# Patient Record
Sex: FEMALE | Race: WHITE | Employment: FULL TIME | URBAN - METROPOLITAN AREA
[De-identification: names, ages, dates, MRNs, and addresses within clinical notes are randomized per-mention and may not be internally consistent; named-entity substitution may affect disease eponyms.]

---

## 2021-01-16 ENCOUNTER — APPOINTMENT (OUTPATIENT)
Dept: GENERAL RADIOLOGY | Age: 61
End: 2021-01-16
Payer: COMMERCIAL

## 2021-01-16 ENCOUNTER — HOSPITAL ENCOUNTER (EMERGENCY)
Age: 61
Discharge: HOME | End: 2021-01-16
Attending: EMERGENCY MEDICINE
Payer: COMMERCIAL

## 2021-01-16 VITALS
OXYGEN SATURATION: 94 % | BODY MASS INDEX: 27.46 KG/M2 | HEIGHT: 63 IN | SYSTOLIC BLOOD PRESSURE: 172 MMHG | WEIGHT: 155 LBS | RESPIRATION RATE: 16 BRPM | HEART RATE: 100 BPM | TEMPERATURE: 96.8 F | DIASTOLIC BLOOD PRESSURE: 102 MMHG

## 2021-01-16 DIAGNOSIS — U07.1 BRONCHITIS DUE TO COVID-19 VIRUS: Primary | ICD-10-CM

## 2021-01-16 DIAGNOSIS — J40 BRONCHITIS DUE TO COVID-19 VIRUS: Primary | ICD-10-CM

## 2021-01-16 PROCEDURE — 6370000000 HC RX 637 (ALT 250 FOR IP): Performed by: EMERGENCY MEDICINE

## 2021-01-16 PROCEDURE — 99283 EMERGENCY DEPT VISIT LOW MDM: CPT

## 2021-01-16 RX ORDER — PREDNISONE 10 MG/1
TABLET ORAL
Qty: 30 TABLET | Refills: 0 | Status: SHIPPED | OUTPATIENT
Start: 2021-01-16 | End: 2021-01-26

## 2021-01-16 RX ORDER — ALBUTEROL SULFATE 90 UG/1
2 AEROSOL, METERED RESPIRATORY (INHALATION) ONCE
Status: COMPLETED | OUTPATIENT
Start: 2021-01-16 | End: 2021-01-16

## 2021-01-16 RX ORDER — PREDNISONE 10 MG/1
60 TABLET ORAL ONCE
Status: COMPLETED | OUTPATIENT
Start: 2021-01-16 | End: 2021-01-16

## 2021-01-16 RX ORDER — ALBUTEROL SULFATE 90 UG/1
2 AEROSOL, METERED RESPIRATORY (INHALATION) EVERY 4 HOURS PRN
Qty: 1 INHALER | Refills: 1 | Status: SHIPPED | OUTPATIENT
Start: 2021-01-16 | End: 2021-02-15

## 2021-01-16 RX ORDER — IPRATROPIUM BROMIDE AND ALBUTEROL SULFATE 2.5; .5 MG/3ML; MG/3ML
1 SOLUTION RESPIRATORY (INHALATION) EVERY 4 HOURS
Qty: 360 ML | Refills: 0 | Status: SHIPPED | OUTPATIENT
Start: 2021-01-16

## 2021-01-16 RX ADMIN — ALBUTEROL SULFATE 2 PUFF: 90 AEROSOL, METERED RESPIRATORY (INHALATION) at 21:12

## 2021-01-16 RX ADMIN — PREDNISONE 60 MG: 10 TABLET ORAL at 21:12

## 2021-01-17 NOTE — ED PROVIDER NOTES
eMERGENCY dEPARTMENT eNCOUnter        [facility]    Chief Complaint   Patient presents with    Shortness of Breath       HPI    Rosemary Parisi is a 62 y.o. female who presents with cough, shortness of breath, wheezing. She states she was diagnosed recently with Covid, states she just finished course of steroids, finished it yesterday. She states when she was on steroid she felt really good. She took it for 5 days total.  She states that since yesterday she has more wheezing, more shortness of breath. She states she was just concerned about going to the night tonight without her inhaler, nebulizer solution and prednisone which she states has been helping her a lot as she does have history of asthma. She denies fever or chills. No chest pain. No edema. No vomiting, diarrhea. REVIEW OF SYSTEMS    Constitutional:  Denies fever, chills  HENT:  See above  Eyes: No vision change, discharge  Cardiovascular:  Denies chest pain, palpitations. Respiratory:  + shortness of breath, + cough  GI:  Denies abdominal pain, vomiting, or diarrhea   :  Denies any urinary symptoms  Extremity: Denies edema, joint pain  Neurologic:  Denies headache, focal weakness  Skin: Denies rash, color change       All other review of systems are negative  See HPI and nursing notes for additional information       PAST MEDICAL AND SURGICAL HISTORY    Past Medical History:   Diagnosis Date    Asthma      History reviewed. No pertinent surgical history. CURRENT MEDICATIONS        ALLERGIES    No Known Allergies    FAMILY AND SOCIAL HISTORY    History reviewed. No pertinent family history.   Social History     Socioeconomic History    Marital status: None     Spouse name: None    Number of children: None    Years of education: None    Highest education level: None   Occupational History    None   Social Needs    Financial resource strain: None    Food insecurity     Worry: None     Inability: None    Transportation needs Medical: None     Non-medical: None   Tobacco Use    Smoking status: Never Smoker    Smokeless tobacco: Never Used   Substance and Sexual Activity    Alcohol use: Not Currently    Drug use: Never    Sexual activity: None   Lifestyle    Physical activity     Days per week: None     Minutes per session: None    Stress: None   Relationships    Social connections     Talks on phone: None     Gets together: None     Attends [employer] service: None     Active member of club or organization: None     Attends meetings of clubs or organizations: None     Relationship status: None    Intimate partner violence     Fear of current or ex partner: None     Emotionally abused: None     Physically abused: None     Forced sexual activity: None   Other Topics Concern    None   Social History Narrative    None       PHYSICAL EXAM    VITAL SIGNS: BP (!) 172/102   Pulse 100   Temp 96.8 °F (36 °C) (Oral)   Resp 16   Ht 5' 3\" (1.6 m)   Wt 155 lb (70.3 kg)   SpO2 94%   BMI 27.46 kg/m²   Constitutional:  Awake, alert, no acute distress, non-toxic appearance   Eyes: PERRL, EOM intact. Conjunctiva normal.  HENT:  - Normocephalic, atraumatic     - Oropharynx mildly erythematous without tonsillar hypertrophy or exudate. No trimus. Handling secretions without difficulty. Neck/Lymphatics:  supple, no lymphadenopathy   Respiratory:  Normal respiratory effort. Occasional end expiratory wheezing. Cardiovascular:  Regular rate, normal rhythm. No murmurs. GI:  Soft, no abdominal tenderness, nondistended. Musculoskeletal:  Normal ROM, no edema   Integument:  Skin pink, warm, dry, no rash      RADIOLOGY/PROCEDURES    No results found. ED COURSE & MEDICAL DECISION MAKING       Vital signs and nursing notes reviewed during ED course. I have independently evaluated this patient . All pertinent Lab data and radiographic results reviewed with patient at bedside.        The patient and/or the family were informed of the results of any tests/labs/imaging, the treatment plan, and time was allotted to answer questions. This is a 52-year-old female who presented with cough, shortness of breath, wheezing. She does have history of asthma, was diagnosed with Covid recently. She was 96% on room air on arrival.  She appears no respiratory distress. She speaks without difficulty. She does have some occasional wheezing. She states she is mainly just concerned about going through the night without her inhaler as she ran out of it. She was requesting a refill of this, did want to continue on prednisone as she was on it for 5 days and that did not seem to help. She was given a dose of that here, inhaler treatment here. Will prescribe continued prednisone, refill of inhaler and nebulizer solution for home as this has been helping her. She has no further concerns at this time. No chest pain, edema. She is well-appearing, nontoxic and does not appear to be in distress. Pressure was elevated in the emergency department, however she is very anxious, did not want to go through the night without her inhaler. Did not request any further testing tonight as she is already been tested for Covid and was positive. Given her well appearance will plan for discharge home with refills of her medications and if she has any new or worsening signs or symptoms she should return for reevaluation. She voiced understanding the discharge instructions return precautions. Clinical  IMPRESSION    Covid infection, history of asthma      Diagnosis and plan discussed in detail with patient who understands and agrees. Patient agrees to return emergency department if symptoms worsen or any new symptoms develop.       (Please note the MDM and HPI sections of this note were completed with a voice recognition program.  Efforts were made to edit the dictations but occasionally words are mis-transcribed.)      Staci Rees DO  01/16/21 2053

## 2021-01-18 ENCOUNTER — CARE COORDINATION (OUTPATIENT)
Dept: CARE COORDINATION | Age: 61
End: 2021-01-18

## 2021-01-19 ENCOUNTER — CARE COORDINATION (OUTPATIENT)
Dept: CARE COORDINATION | Age: 61
End: 2021-01-19

## 2024-03-25 ENCOUNTER — HOSPITAL ENCOUNTER (EMERGENCY)
Age: 64
Discharge: HOME | End: 2024-03-25
Attending: EMERGENCY MEDICINE
Payer: COMMERCIAL

## 2024-03-25 VITALS
HEIGHT: 62 IN | WEIGHT: 149 LBS | HEART RATE: 102 BPM | DIASTOLIC BLOOD PRESSURE: 94 MMHG | SYSTOLIC BLOOD PRESSURE: 140 MMHG | OXYGEN SATURATION: 95 % | RESPIRATION RATE: 18 BRPM | TEMPERATURE: 99.4 F | BODY MASS INDEX: 27.42 KG/M2

## 2024-03-25 DIAGNOSIS — J45.21 MILD INTERMITTENT ASTHMA WITH EXACERBATION: Primary | ICD-10-CM

## 2024-03-25 DIAGNOSIS — J06.9 VIRAL UPPER RESPIRATORY TRACT INFECTION WITH COUGH: ICD-10-CM

## 2024-03-25 PROCEDURE — 6370000000 HC RX 637 (ALT 250 FOR IP): Performed by: EMERGENCY MEDICINE

## 2024-03-25 PROCEDURE — 99283 EMERGENCY DEPT VISIT LOW MDM: CPT

## 2024-03-25 RX ORDER — FLUTICASONE PROPIONATE 50 MCG
2 SPRAY, SUSPENSION (ML) NASAL DAILY
Qty: 16 G | Refills: 0 | Status: SHIPPED | OUTPATIENT
Start: 2024-03-25

## 2024-03-25 RX ORDER — ALBUTEROL SULFATE 2.5 MG/3ML
2.5 SOLUTION RESPIRATORY (INHALATION) 4 TIMES DAILY PRN
Qty: 120 EACH | Refills: 0 | Status: SHIPPED | OUTPATIENT
Start: 2024-03-25

## 2024-03-25 RX ORDER — PREDNISONE 50 MG/1
50 TABLET ORAL DAILY
Qty: 5 TABLET | Refills: 0 | Status: SHIPPED | OUTPATIENT
Start: 2024-03-25 | End: 2024-03-30

## 2024-03-25 RX ORDER — IPRATROPIUM BROMIDE AND ALBUTEROL SULFATE 2.5; .5 MG/3ML; MG/3ML
1 SOLUTION RESPIRATORY (INHALATION) ONCE
Status: COMPLETED | OUTPATIENT
Start: 2024-03-25 | End: 2024-03-25

## 2024-03-25 RX ORDER — PREDNISONE 20 MG/1
60 TABLET ORAL ONCE
Status: COMPLETED | OUTPATIENT
Start: 2024-03-25 | End: 2024-03-25

## 2024-03-25 RX ADMIN — IPRATROPIUM BROMIDE AND ALBUTEROL SULFATE 1 DOSE: 2.5; .5 SOLUTION RESPIRATORY (INHALATION) at 08:50

## 2024-03-25 RX ADMIN — PREDNISONE 60 MG: 20 TABLET ORAL at 08:49

## 2024-03-25 ASSESSMENT — PAIN - FUNCTIONAL ASSESSMENT
PAIN_FUNCTIONAL_ASSESSMENT: NONE - DENIES PAIN
PAIN_FUNCTIONAL_ASSESSMENT: NONE - DENIES PAIN

## 2024-03-25 NOTE — ED NOTES
Discharge instruction and prescriptions were reviewed and the patient will follow up with Pulmonology. The patient voiced understanding.

## 2024-03-25 NOTE — DISCHARGE INSTRUCTIONS
Follow-up with and establish primary care physician with Dr. Hough call for an appointment  Keep scheduled appointment with pulmonologist in 2 days.  Take prednisone as prescribed and directed  Take albuterol nebulizer solution every 4 hours as needed  Take Flonase as prescribed and directed  Return to the emergency department immediately pain fever chills nausea dizzy lightheadedness or any worsening symptoms.

## 2024-03-25 NOTE — DISCHARGE INSTR - COC
Continuity of Care Form    Patient Name: Kalyn Phillips   :  1960  MRN:  [id number]    Admit date:  3/25/2024  Discharge date:  ***    Code Status Order: No Order   Advance Directives:     Admitting Physician:  No admitting provider for patient encounter.  PCP: No primary care provider on file.    Discharging Nurse: ***  Discharging Hospital Unit/Room#: ED-03/E03  Discharging Unit Phone Number: ***    Emergency Contact:   Extended Emergency Contact Information  Primary Emergency Contact: Chris Khanhanie  Mobile Phone: [phone]  Relation: Brother/Sister   needed? No    Past Surgical History:  History reviewed. No pertinent surgical history.    Immunization History:     There is no immunization history on file for this patient.    Active Problems:  There is no problem list on file for this patient.      Isolation/Infection:   Isolation            No Isolation          Patient Infection Status       None to display            Nurse Assessment:  Last Vital Signs: BP (!) 140/94   Pulse (!) 102   Temp 99.4 °F (37.4 °C) (Temporal)   Resp 18   Ht 1.575 m (5' 2\")   Wt 67.6 kg (149 lb)   SpO2 95%   BMI 27.25 kg/m²     Last documented pain score (0-10 scale):    Last Weight:   Wt Readings from Last 1 Encounters:   24 67.6 kg (149 lb)     Mental Status:  {IP PT MENTAL STATUS:}    IV Access:  { YONG IV ACCESS:632444412}    Nursing Mobility/ADLs:  Walking   {CHP DME ADLs:396352288}  Transfer  {CHP DME ADLs:156533650}  Bathing  {CHP DME ADLs:450257099}  Dressing  {CHP DME ADLs:662977957}  Toileting  {CHP DME ADLs:312803521}  Feeding  {CHP DME ADLs:047177423}  Med Admin  {CHP DME ADLs:083594509}  Med Delivery   { YONG MED Delivery:496487757}    Wound Care Documentation and Therapy:        Elimination:  Continence:   Bowel: {YES / NO:}  Bladder: {YES / NO:}  Urinary Catheter: {Urinary Catheter:123238990}   Colostomy/Ileostomy/Ileal Conduit: {YES / NO:}       Date of Last

## 2024-03-25 NOTE — ED PROVIDER NOTES
Emergency Department Encounter    Patient: Kalyn Phillips  MRN: [id number]  : 1960  Date of Evaluation: 3/25/2024  ED Provider:  Deysi Ohara DO    Triage Chief Complaint:   Asthma (Out of breathing treatments med nebs )    False Pass:  Kalyn Phillips is a 63 y.o. female with history of asthma that presents to the emergency department complaining of shortness of breath that got worse last evening.  She states her asthma is flaring up.  Patient states she is out of her nebulizer butyryl solution.  Patient states some nasal congestion in 1 nostril.  Patient states she has been using medication to unplug her nose.  Patient states she also has seasonal allergies.  Patient states she has gotten a appointment with the pulmonologist in a couple days.  Patient states wheezing has gotten worse so she came to the ER for evaluation.    ROS - see HPI, below listed is current ROS at time of my eval:  General:  No fevers, no chills, no weakness  Eyes:  No recent vison changes, no discharge  ENT:  No sore throat, no nasal congestion, no hearing changes  Cardiovascular:  No chest pain, no palpitations  Respiratory: Positive for shortness of breath, cough, wheezing  Gastrointestinal:  No pain, no nausea, no vomiting, no diarrhea  Musculoskeletal:  No muscle pain, no joint pain  Skin:  No rash, no pruritis, no easy bruising  Neurologic:  No speech problems, no headache, no extremity numbness, no extremity tingling, no extremity weakness  Psychiatric:  No anxiety  Genitourinary:  No dysuria, no hematuria  Endocrine:  No unexpected weight gain, no unexpected weight loss  Extremities:  no edema, no pain    Past Medical History:   Diagnosis Date    Asthma      History reviewed. No pertinent surgical history.  History reviewed. No pertinent family history.  Social History     Socioeconomic History    Marital status: Single     Spouse name: Not on file    Number of children: Not on file    Years of education: Not on file

## 2024-03-25 NOTE — ED NOTES
Pt reports asthma flare up states she was sick several weeks ago and was taking muccinex states she was given breathing treatments and steroids states she is now out of them and states she is scheduled with pulmonology but doesn't want to wait that long for her refills  Pt is alert and oriented and in no distress at this time